# Patient Record
(demographics unavailable — no encounter records)

---

## 2024-12-30 NOTE — HISTORY OF PRESENT ILLNESS
[FreeTextEntry1] : bump [de-identified] : bump on left shoulder  sometimes gets raised asymptomatic